# Patient Record
Sex: FEMALE | Race: BLACK OR AFRICAN AMERICAN | NOT HISPANIC OR LATINO | Employment: OTHER | URBAN - METROPOLITAN AREA
[De-identification: names, ages, dates, MRNs, and addresses within clinical notes are randomized per-mention and may not be internally consistent; named-entity substitution may affect disease eponyms.]

---

## 2017-06-16 PROBLEM — K37 APPENDICITIS: Status: ACTIVE | Noted: 2017-06-16

## 2018-09-13 PROBLEM — K57.20 COLONIC DIVERTICULAR ABSCESS: Status: ACTIVE | Noted: 2018-09-13

## 2018-09-15 PROBLEM — K57.92 DIVERTICULITIS: Status: ACTIVE | Noted: 2018-09-13

## 2023-09-10 ENCOUNTER — HOSPITAL ENCOUNTER (EMERGENCY)
Facility: HOSPITAL | Age: 78
Discharge: HOME | End: 2023-09-10
Attending: EMERGENCY MEDICINE
Payer: COMMERCIAL

## 2023-09-10 VITALS
SYSTOLIC BLOOD PRESSURE: 111 MMHG | OXYGEN SATURATION: 99 % | HEART RATE: 56 BPM | DIASTOLIC BLOOD PRESSURE: 62 MMHG | WEIGHT: 114 LBS | RESPIRATION RATE: 20 BRPM | BODY MASS INDEX: 20.85 KG/M2 | TEMPERATURE: 98 F

## 2023-09-10 DIAGNOSIS — M25.531 RIGHT WRIST PAIN: Primary | ICD-10-CM

## 2023-09-10 DIAGNOSIS — S69.90XA WRIST INJURY: ICD-10-CM

## 2023-09-10 DIAGNOSIS — M79.641 PAIN OF RIGHT HAND: ICD-10-CM

## 2023-09-10 PROCEDURE — 25000003 PHARM REV CODE 250: Performed by: NURSE PRACTITIONER

## 2023-09-10 PROCEDURE — 99283 EMERGENCY DEPT VISIT LOW MDM: CPT

## 2023-09-10 RX ORDER — TRAMADOL HYDROCHLORIDE 50 MG/1
50 TABLET ORAL
Status: COMPLETED | OUTPATIENT
Start: 2023-09-10 | End: 2023-09-10

## 2023-09-10 RX ORDER — OXYCODONE AND ACETAMINOPHEN 5; 325 MG/1; MG/1
1 TABLET ORAL
Status: DISCONTINUED | OUTPATIENT
Start: 2023-09-10 | End: 2023-09-10

## 2023-09-10 RX ADMIN — TRAMADOL HYDROCHLORIDE 50 MG: 50 TABLET, COATED ORAL at 05:09

## 2023-09-10 NOTE — DISCHARGE INSTRUCTIONS
Take Tylenol as directed for your pain.  Return to the ED for any worsening of symptoms or any other concerns.  Follow up your primary care doctor.

## 2023-09-10 NOTE — ED PROVIDER NOTES
Encounter Date: 9/10/2023       History     Chief Complaint   Patient presents with    Fall     Right hand and arm pain, trip and fall, did not hit head, no LOC     Presents with complaint of right hand and wrist pain.  Onset this morning around 940 when she tripped over a rug at home and fell with her arm stretched out.  She denies hitting her head.  The lady who lives with her denies that the patient hit her head.  Her son is here with her.  He took her to [facility] today.  She is just coming from [place].  He is now here to x-ray her wrist.  She is not taken any medication for the pain.      Review of patient's allergies indicates:   Allergen Reactions    Lisinopril      Past Medical History:   Diagnosis Date    Encounter for blood transfusion     Hypertension     TIA (transient ischemic attack)     many years ago     Past Surgical History:   Procedure Laterality Date    APPENDECTOMY  06/13/2017    BRAIN SURGERY  2005    excision tumor  (in [facility])    CHOLECYSTECTOMY      CORONARY ANGIOPLASTY WITH STENT PLACEMENT      GASTRIC BYPASS      HYSTERECTOMY      THYROID SURGERY       Family History   Problem Relation Age of Onset    Kidney disease Mother     Heart disease Father      Social History     Tobacco Use    Smoking status: Never    Smokeless tobacco: Never   Substance Use Topics    Alcohol use: No     Review of Systems   Constitutional:  Negative for fever.   Respiratory:  Negative for cough, shortness of breath and wheezing.    Cardiovascular:  Negative for chest pain, palpitations and leg swelling.   Gastrointestinal:  Negative for abdominal pain, diarrhea, nausea and vomiting.   Genitourinary:  Negative for dysuria.   Musculoskeletal:  Negative for back pain, gait problem, joint swelling and neck pain.        Right wrist and hand pain.   Skin:  Negative for rash.   Neurological:  Negative for weakness.       Physical Exam     Initial Vitals [09/10/23 1524]   BP Pulse Resp Temp SpO2   (!) 84/66 (!)  52 16 97.6 °F (36.4 °C) 100 %      MAP       --         Physical Exam    Constitutional: She appears well-developed and well-nourished.   HENT:   Head: Normocephalic and atraumatic.   Mouth/Throat: Oropharynx is clear and moist.   Eyes: Conjunctivae are normal.   Neck: Neck supple.   Negative for bony tenderness.   Normal range of motion.  Cardiovascular:  Normal rate, regular rhythm and normal heart sounds.           Pulmonary/Chest: Breath sounds normal. No respiratory distress.   Abdominal: Abdomen is soft. Bowel sounds are normal. She exhibits no distension. There is no abdominal tenderness.   Musculoskeletal:         General: Tenderness present. Normal range of motion.      Cervical back: Normal range of motion and neck supple.      Comments: Tenderness to the posterior wrist.  Patient has full range of motion.  She does state this increases her pain with movement.  There is no tenderness to the hand.  Fingers are warm and mobile.  Cap refill is less than 2 seconds.  There is no swelling and no outward sign trauma.     Neurological: She is alert and oriented to person, place, and time. No sensory deficit. GCS score is 15. GCS eye subscore is 4. GCS verbal subscore is 5. GCS motor subscore is 6.   Skin: Skin is warm and dry. Capillary refill takes less than 2 seconds.   Psychiatric: She has a normal mood and affect. Thought content normal.         ED Course   Procedures  Labs Reviewed - No data to display       Imaging Results              X-Ray Hand 3 view Right (Preliminary result)  Result time 09/10/23 17:27:36      Wet Read by Ayesha Kapadia MD (09/10/23 17:27:36, [facility], Emergency Medicine)    Fracture or dislocation arthritic change seen                                     X-Ray Wrist Complete Right (Preliminary result)  Result time 09/10/23 17:27:44      Wet Read by Ayesha Kapadia MD (09/10/23 17:27:44, [facility], Emergency  Medicine)    Fracture or dislocation arthritic change seen                                     Medications   traMADoL tablet 50 mg (50 mg Oral Given 9/10/23 1757)     Medical Decision Making  Presents with right wrist and hand pain.  Onset at 9:  30 a.m. today.  Patient was at home and tripped on a rug.  She denies head injury.  Her son is here with her.  He states he picked her up from [employer] about 930.  She did go to [employer] in spent the rest of the day in [place].  She has just returned from [place].  He is here because she continues to complain of pain in the wrist.    Attending Note:  I discussed the patient's care with Advanced Practice Clinician.  I reviewed their note and agree with the history, physical, assessment, diagnosis, treatment, all procedures performed, xray and EKG interpretations and discharge plan provided by the Advanced Practice Clinician. My overall impression is fall, right hand and wrist pain .  The patient has been instructed to follow up with their physician or the one provided as well as specific return precautions.   Ayesha Kapadia M.D. 9/10/2023 5:28 PM        Amount and/or Complexity of Data Reviewed  Radiology: ordered and independent interpretation performed.     Details: Dr. Kapadia has read the wrist and hand is negative.  Discussion of management or test interpretation with external provider(s): I have given this patient Ultram for pain here in the ED.  She reports she can not take NSAIDs.  I explained her that I could not send her home with Ultram.  She is to take Tylenol as directed for pain.  She is to follow up with the primary care doctor.  She is to return to the ED for any worsening of symptoms or any other concerns.    Risk  Prescription drug management.                               Clinical Impression:   Final diagnoses:  [S69.90XA] Wrist injury  [M25.531] Right wrist pain (Primary)  [M79.641] Pain of right hand        ED Disposition Condition    Discharge Stable           ED Prescriptions    None       Follow-up Information       Follow up With Specialties Details Why Contact Info    Sylvie Love MD Family Medicine In 3 days  [address] [facility] [address]  [phone]               Livia Boss NP  09/10/23 1759       Ayesha Kapadia MD  09/10/23 2004

## 2023-09-12 NOTE — PROGRESS NOTES
No specific fracture noted on x-ray imaging however patient has diffuse osteopenia please have her follow up with orthopedist

## 2023-09-22 ENCOUNTER — TELEPHONE (OUTPATIENT)
Dept: EMERGENCY MEDICINE | Facility: HOSPITAL | Age: 78
End: 2023-09-22